# Patient Record
Sex: FEMALE | Race: OTHER | NOT HISPANIC OR LATINO
[De-identification: names, ages, dates, MRNs, and addresses within clinical notes are randomized per-mention and may not be internally consistent; named-entity substitution may affect disease eponyms.]

---

## 2023-08-25 PROBLEM — Z00.00 ENCOUNTER FOR PREVENTIVE HEALTH EXAMINATION: Status: ACTIVE | Noted: 2023-08-25

## 2023-08-29 ENCOUNTER — APPOINTMENT (OUTPATIENT)
Dept: RHEUMATOLOGY | Facility: CLINIC | Age: 23
End: 2023-08-29
Payer: MEDICAID

## 2023-08-29 VITALS
RESPIRATION RATE: 17 BRPM | BODY MASS INDEX: 19.49 KG/M2 | WEIGHT: 110 LBS | TEMPERATURE: 98 F | DIASTOLIC BLOOD PRESSURE: 66 MMHG | HEIGHT: 63 IN | OXYGEN SATURATION: 99 % | SYSTOLIC BLOOD PRESSURE: 102 MMHG | HEART RATE: 74 BPM

## 2023-08-29 DIAGNOSIS — Z80.9 FAMILY HISTORY OF MALIGNANT NEOPLASM, UNSPECIFIED: ICD-10-CM

## 2023-08-29 DIAGNOSIS — R76.8 OTHER SPECIFIED ABNORMAL IMMUNOLOGICAL FINDINGS IN SERUM: ICD-10-CM

## 2023-08-29 DIAGNOSIS — R21 RASH AND OTHER NONSPECIFIC SKIN ERUPTION: ICD-10-CM

## 2023-08-29 DIAGNOSIS — Z83.3 FAMILY HISTORY OF DIABETES MELLITUS: ICD-10-CM

## 2023-08-29 DIAGNOSIS — Z82.49 FAMILY HISTORY OF ISCHEMIC HEART DISEASE AND OTHER DISEASES OF THE CIRCULATORY SYSTEM: ICD-10-CM

## 2023-08-29 DIAGNOSIS — Z78.9 OTHER SPECIFIED HEALTH STATUS: ICD-10-CM

## 2023-08-29 PROCEDURE — 99204 OFFICE O/P NEW MOD 45 MIN: CPT

## 2023-08-29 RX ORDER — HYDROCORTISONE 25 MG/G
2.5 CREAM TOPICAL
Refills: 0 | Status: ACTIVE | COMMUNITY

## 2023-08-29 RX ORDER — TRIAMCINOLONE ACETONIDE 1 MG/G
0.1 CREAM TOPICAL
Refills: 0 | Status: ACTIVE | COMMUNITY

## 2023-08-29 RX ORDER — CETIRIZINE HYDROCHLORIDE 10 MG/1
TABLET, FILM COATED ORAL
Refills: 0 | Status: ACTIVE | COMMUNITY

## 2023-08-31 PROBLEM — Z83.3 FAMILY HISTORY OF DIABETES MELLITUS: Status: ACTIVE | Noted: 2023-08-29

## 2023-08-31 PROBLEM — Z78.9 NON-SMOKER: Status: ACTIVE | Noted: 2023-08-29

## 2023-08-31 PROBLEM — Z80.9 FAMILY HISTORY OF MALIGNANT NEOPLASM: Status: ACTIVE | Noted: 2023-08-29

## 2023-08-31 PROBLEM — Z82.49 FAMILY HISTORY OF HYPERTENSION: Status: ACTIVE | Noted: 2023-08-29

## 2023-08-31 PROBLEM — R76.8 ANA POSITIVE: Status: ACTIVE | Noted: 2023-08-31

## 2023-08-31 PROBLEM — Z78.9 SOCIAL ALCOHOL USE: Status: ACTIVE | Noted: 2023-08-29

## 2023-08-31 PROBLEM — R21 RASH: Status: ACTIVE | Noted: 2023-08-31

## 2023-08-31 NOTE — HISTORY OF PRESENT ILLNESS
[FreeTextEntry1] : 22 year old female with PMH as listed below presents today for an initial evaluation   Found to have +CLAYTON 1:80 in the setting of intermittent rashes, SICCA symptoms x 2-3 years now.  Labs/chart reviewed. CLAYTON: 1:80, all other serologies unremarkable  Has been evaluated by multiple different dermatologist/ allergist in the past with work up unremarkable per pt.  Has had 2 biopsies in the past- unremarkable.  Has tried antihistamines, multiple creams with no significant improvement in pain. Her rash comes and goes. Not photosensitive.   denies fever, chills, oral/nasal ulcers, denies chest pain, chest palpitations, denies sob, denies nausea, vomiting, abdominal pain, diarrhea, constipation, blood in stool, denies dysuria, blood in the urine, denies joint pain, joint swelling, muscle pain, muscle weakness, photosensitivity, hair loss, skin thickening, denies blood clots, raynaud's.  FH: denies FH of autoimmune disease

## 2023-08-31 NOTE — ASSESSMENT
[FreeTextEntry1] :  +CLAYTON 1:80 in the setting of intermittent rashes Labs/chart reviewed. CLAYTON: 1:80, all other serologies unremarkable  - c/w dermatology/allergy f.u  Discussed treatment plan with the patient. The patient was given the opportunity to ask questions and all questions were answered to their satisfaction.

## 2024-04-02 ENCOUNTER — OFFICE (OUTPATIENT)
Dept: URBAN - METROPOLITAN AREA CLINIC 12 | Facility: CLINIC | Age: 24
Setting detail: OPHTHALMOLOGY
End: 2024-04-02
Payer: MEDICAID

## 2024-04-02 ENCOUNTER — RX ONLY (RX ONLY)
Age: 24
End: 2024-04-02

## 2024-04-02 DIAGNOSIS — H16.223: ICD-10-CM

## 2024-04-02 PROCEDURE — 99213 OFFICE O/P EST LOW 20 MIN: CPT | Performed by: STUDENT IN AN ORGANIZED HEALTH CARE EDUCATION/TRAINING PROGRAM

## 2024-04-30 ENCOUNTER — OFFICE (OUTPATIENT)
Dept: URBAN - METROPOLITAN AREA CLINIC 12 | Facility: CLINIC | Age: 24
Setting detail: OPHTHALMOLOGY
End: 2024-04-30
Payer: MEDICAID

## 2024-04-30 DIAGNOSIS — H40.013: ICD-10-CM

## 2024-04-30 PROBLEM — B30.9 VIRAL CONJUNCTIVITIS ; BOTH EYES: Status: RESOLVED | Noted: 2024-04-02 | Resolved: 2024-04-30

## 2024-04-30 PROBLEM — H16.223 DRY EYE SYNDROME K SICCA; BOTH EYES: Status: ACTIVE | Noted: 2024-04-02

## 2024-04-30 PROCEDURE — 92014 COMPRE OPH EXAM EST PT 1/>: CPT | Performed by: STUDENT IN AN ORGANIZED HEALTH CARE EDUCATION/TRAINING PROGRAM

## 2024-04-30 PROCEDURE — 92250 FUNDUS PHOTOGRAPHY W/I&R: CPT | Performed by: STUDENT IN AN ORGANIZED HEALTH CARE EDUCATION/TRAINING PROGRAM

## 2025-05-06 ENCOUNTER — OFFICE (OUTPATIENT)
Dept: URBAN - METROPOLITAN AREA CLINIC 12 | Facility: CLINIC | Age: 25
Setting detail: OPHTHALMOLOGY
End: 2025-05-06
Payer: MEDICAID

## 2025-05-06 DIAGNOSIS — H40.013: ICD-10-CM

## 2025-05-06 DIAGNOSIS — H52.13: ICD-10-CM

## 2025-05-06 DIAGNOSIS — H16.223: ICD-10-CM

## 2025-05-06 PROCEDURE — 92014 COMPRE OPH EXAM EST PT 1/>: CPT | Performed by: STUDENT IN AN ORGANIZED HEALTH CARE EDUCATION/TRAINING PROGRAM

## 2025-05-06 PROCEDURE — 92250 FUNDUS PHOTOGRAPHY W/I&R: CPT | Performed by: STUDENT IN AN ORGANIZED HEALTH CARE EDUCATION/TRAINING PROGRAM

## 2025-05-06 PROCEDURE — 92015 DETERMINE REFRACTIVE STATE: CPT | Performed by: STUDENT IN AN ORGANIZED HEALTH CARE EDUCATION/TRAINING PROGRAM

## 2025-05-06 ASSESSMENT — REFRACTION_MANIFEST
OS_VA1: 20/20
OD_AXIS: 175
OS_CYLINDER: -2.50
OD_CYLINDER: -2.50
OS_AXIS: 180
OD_VA1: 20/20
OS_SPHERE: -4.50
OD_SPHERE: -4.25

## 2025-05-06 ASSESSMENT — TONOMETRY
OS_IOP_MMHG: 14
OD_IOP_MMHG: 12

## 2025-05-06 ASSESSMENT — REFRACTION_AUTOREFRACTION
OS_SPHERE: -4.50
OD_SPHERE: -4.25
OD_AXIS: 176
OS_CYLINDER: -2.50
OS_AXIS: 005
OD_CYLINDER: -2.50

## 2025-05-06 ASSESSMENT — KERATOMETRY
METHOD_AUTO_MANUAL: AUTO
OS_K1POWER_DIOPTERS: 41.00
OS_K2POWER_DIOPTERS: 73.25
OD_AXISANGLE_DEGREES: 079
OD_K2POWER_DIOPTERS: 43.50
OS_AXISANGLE_DEGREES: 096
OD_K1POWER_DIOPTERS: 41.75

## 2025-05-06 ASSESSMENT — SUPERFICIAL PUNCTATE KERATITIS (SPK)
OD_SPK: 1+
OS_SPK: 1+

## 2025-05-06 ASSESSMENT — CONFRONTATIONAL VISUAL FIELD TEST (CVF)
OD_FINDINGS: FULL
OS_FINDINGS: FULL

## 2025-05-06 ASSESSMENT — VISUAL ACUITY
OS_BCVA: 20/40-1
OD_BCVA: 20/40